# Patient Record
(demographics unavailable — no encounter records)

---

## 2024-10-28 NOTE — DISCUSSION/SUMMARY
[FreeTextEntry1] : In summary, DIVYA is a 5 year old female here for murmur. Her physical exam is notable for soft systolic murmur consistent with a benign flow murmur. Her EKG shows sinus rhythm, and her echocardiogram shows normal intracardiac anatomy with good biventricular systolic function and no effusion. Given these results and her clinical presentation, I provided reassurance and explained that DIVYA has a structurally normal heart. No further cardiac work up or follow up is necessary at this time. However, I would recommend re-evaluation if there are any new or worsening symptoms in the future.   Plan: - Return as needed for any new and/or worsening symptoms. - No activity restrictions. - No SBE prophylaxis.     Please do not hesitate to contact me if you have any questions.   Solis Lemon MD, MS, FAAP, FACC Attending Physician, Pediatric Cardiology [facility] [address], [address] [place] [address] Office: [phone] Fax: [phone] Email: [email or web address]     I have spent 50 minutes of time on the encounter excluding separately reported services.

## 2024-10-28 NOTE — HISTORY OF PRESENT ILLNESS
[FreeTextEntry1] : Dear Dr. Cachorro Fuller:   I had the pleasure of seeing your patient, KAREN LITTLE, in my office today, 10/28/2024. As you know, she is a 5 year old female referred to pediatric cardiology due to murmur.   Karen was recently noted to have a murmur. She is otherwise healthy. There is no history of chest pain, palpitations, SOB, headaches, vision changes, dizziness, or syncope. No fevers or URI symptoms. No family history of congenital heart disease or sudden/unexplained death. No family member with a known genetic syndrome.

## 2024-11-04 NOTE — BIRTH HISTORY
[Prematurity] : not premature [NICU Stay] : ~He/She~ did not stay in NICU [Respiratory Effort] : normal respiratory effort [Normal Rate and Rhythm] : normal rate and rhythm [de-identified] : soft, non-tender, non-distended. Well-healed port sites and Pfannenstiel incision.  [de-identified] : awake, alert, no acute distress.

## 2024-11-04 NOTE — HISTORY OF PRESENT ILLNESS
[FreeTextEntry1] : Karen Urias is a 4 y/o female with a cc/ of a left calf lesion which she has had since birth.  It grew and indented skin but never was discolored nor was it painful.  Karen is very aware of its presence despite it being grossly asymptomatic. Mother went to a dermatologist who biopsied it and said it was possibly a vascular lesion and wanted to laser it.  She is here for another opinion.

## 2024-11-04 NOTE — ASSESSMENT
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  The patient is very sensitive to the lesion and complains of some intermittent pain and will not let people see it or touch it.  This is very concerning to the parents who would like it removed if possible.  I will obtain an US to clearly identify the lesion and then will sit down with the parents to discuss treatment options.  They will return after the exam.

## 2024-11-04 NOTE — REASON FOR VISIT
[Initial - Scheduled] : an initial, scheduled visit with concerns of [Mother] : mother [FreeTextEntry3] : possible vascular anomaly [FreeTextEntry4] : VINOD RED

## 2024-11-04 NOTE — CONSULT LETTER
[Dear  ___] : Dear  [unfilled], [Please see my note below.] : Please see my note below. [FreeTextEntry1] : I had the pleasure of seeing DIVYA LITTLE in my office on Nov 04, 2024 Thank you very much for letting me participate in DIVYA LITTLE 's care and I will keep you informed of her progress. Sincerely, Lennox Barreto M.D.

## 2024-11-04 NOTE — HISTORY OF PRESENT ILLNESS
[FreeTextEntry1] : Karen Urias is a 6 y/o female with a cc/ of a left calf lesion which she has had since birth.  It grew and indented skin but never was discolored nor was it painful.  Karen is very aware of its presence despite it being grossly asymptomatic. Mother went to a dermatologist who biopsied it and said it was possibly a vascular lesion and wanted to laser it.  She is here for another opinion.

## 2024-11-04 NOTE — PHYSICAL EXAM
[Acute Distress] : no acute distress [Alert] : alert [Toxic appearing] : well appearing [Moves all extremities x4] : moves all extremities x4 [Warm, well perfused x4] : warm, well perfused x4 [Capillary refill < 2s] : Capillary refill < 2s [NL] : grossly intact [Grossly intact] : grossly intact [TextBox_73] : Left calf- back of calf in subcutaneous tissue lesion attached to the skin mobile in soft tissue not appearing to be associated with the muscle. No pain, no infection/inflammation.Skin not involved and no discoloration. About 3cm with no real firm boundary.  Amorphous appearing with induration and septated appearing structure,

## 2024-11-15 NOTE — PHYSICAL EXAM
[Acute Distress] : no acute distress [Alert] : alert [Toxic appearing] : well appearing [NL] : moves all extremities x4, warm well perfused x4 [Moves all extremities x4] : moves all extremities x4 [Warm, well perfused x4] : warm, well perfused x4 [Capillary refill < 2s] : Capillary refill < 2s [TextBox_73] : back of left calf- there is a 2cm area with an amorphous mass in the subcutaneous tissue.  Feel more of an induration than a distinct mass , no pain, no infection. There are some skin changes over the area  flora

## 2024-11-15 NOTE — HISTORY OF PRESENT ILLNESS
[FreeTextEntry1] : Karen Urias is a 6 y/o female with a cc/ of a lesion on the back of her left calf seen initially in the office on November 1. The lesion was thought to be a vascular anomaly since it presented at birth with skin changes and an amorphous mass in the soft tissue on exam.  The patient had an US which showed an ill-defined lesion with no vascularity but fluid in a crescent shaped pattern. She is now here for a post US evaluation.

## 2024-11-15 NOTE — PHYSICAL EXAM
[Acute Distress] : no acute distress [Alert] : alert [Toxic appearing] : well appearing [NL] : moves all extremities x4, warm well perfused x4 [Moves all extremities x4] : moves all extremities x4 [Warm, well perfused x4] : warm, well perfused x4 [Capillary refill < 2s] : Capillary refill < 2s [TextBox_73] : back of left calf- there is a 2cm area with an amorphous mass in the subcutaneous tissue.  Feel more of an induration than a distinct mass , no pain, no infection. There are some skin changes over the area

## 2024-11-15 NOTE — REASON FOR VISIT
[Follow-up - Scheduled] : a follow-up, scheduled visit for [Mother] : mother [FreeTextEntry3] : left calf vascular anomaly

## 2024-11-15 NOTE — ASSESSMENT
[FreeTextEntry1] : Overall, Karen is a 6 y/o female with a lesion in the back of her left calf which is most likely a previous vascular anomaly that involuted and is now just a scarred area with some fluid.  It is completely benign.  My recommendation is to monitor this since it is asymptomatic adn to resect it only if it grows or becomes symptomatic.  Mother also wants to wait to see if it evolves before having Karen undergo an operative procedure.  She is to return to the office in one year or earlier if needed.

## 2024-11-15 NOTE — CONSULT LETTER
[Dear  ___] : Dear  [unfilled], [Please see my note below.] : Please see my note below. [FreeTextEntry1] : I had the pleasure of seeing DIVYA LITTLE in my office on Nov 15, 2024 Thank you very much for letting me participate in DIVYA LITTLE 's care and I will keep you informed of her progress. Sincerely, Lennox Barreto M.D.

## 2024-11-15 NOTE — HISTORY OF PRESENT ILLNESS
[FreeTextEntry1] : Karen Urias is a 4 y/o female with a cc/ of a lesion on the back of her left calf seen initially in the office on November 1. The lesion was thought to be a vascular anomaly since it presented at birth with skin changes and an amorphous mass in the soft tissue on exam.  The patient had an US which showed an ill-defined lesion with no vascularity but fluid in a crescent shaped pattern. She is now here for a post US evaluation.